# Patient Record
Sex: MALE | Race: OTHER | Employment: FULL TIME | URBAN - METROPOLITAN AREA
[De-identification: names, ages, dates, MRNs, and addresses within clinical notes are randomized per-mention and may not be internally consistent; named-entity substitution may affect disease eponyms.]

---

## 2017-03-30 PROBLEM — F19.10 POLYSUBSTANCE ABUSE (HCC): Status: ACTIVE | Noted: 2017-03-30

## 2017-03-31 PROBLEM — I10 ESSENTIAL HYPERTENSION: Status: ACTIVE | Noted: 2017-03-31

## 2017-03-31 PROBLEM — F14.20 COCAINE USE DISORDER, SEVERE, DEPENDENCE (HCC): Status: ACTIVE | Noted: 2017-03-31

## 2018-01-31 PROBLEM — F10.20 UNCOMPLICATED ALCOHOL DEPENDENCE (HCC): Status: ACTIVE | Noted: 2018-01-31

## 2018-01-31 PROBLEM — F19.20 SUBSTANCE DEPENDENCY (HCC): Status: ACTIVE | Noted: 2018-01-31

## 2018-01-31 NOTE — ED NOTES
LINDSAY level of care assessment was completed at [facility]. When pt is awake it will be re staffed with on call psychiatrist for final disposition.

## 2018-01-31 NOTE — ED PROVIDER NOTES
Patient is being admitted at the [facility] voluntarily for treatment. He was awake and conversant.   He was able tolerate with steady gait

## 2018-01-31 NOTE — ED INITIAL ASSESSMENT (HPI)
Pt walk-in with family from [facility], requesting detox from heroin and cocaine. States last use was yesterday around noon. Assessment started at [facility], pt unable to participate, too drowsy, sent here for med clearance.  Denies SI  Or HI

## 2018-01-31 NOTE — ED NOTES
Met with pt re: inpt detox. Pt is agreeable to inpt detox. Writer explained process, pt verbalized understanding.

## 2018-01-31 NOTE — ED PROVIDER NOTES
Patient Seen in: [facility] Emergency Department    History   Patient presents with:  Medical Clearance (constitutional)    Stated Complaint:     HPI    Patient is a 64-year-old with a history of hypertension, previous aortic valve replacement due t negative except as noted above.     Physical Exam   ED Triage Vitals [01/31/18 0431]  BP: 126/69  Pulse: 58  Resp: 16  Temp: 98.2 °F (36.8 °C)  Temp src: Temporal  SpO2: 96 %  O2 Device: None (Room air)    Current:BP 96/62   Pulse 56   Temp 98.2 °F (36.8 °C URINALYSIS WITH CULTURE REFLEX   RAINBOW DRAW BLUE   RAINBOW DRAW LAVENDER   RAINBOW DRAW LIGHT GREEN   RAINBOW DRAW GOLD     EKG    Rate, intervals and axes as noted on EKG Report.   Rate: 55  Rhythm: Sinus Rhythm  Reading: No dysrhythmia no ischemic nik

## 2018-01-31 NOTE — ED NOTES
Assumed care of patient. Patient resting comfortably on cart at this time. Appears to be sleeping. Will continue to monitor.

## 2018-01-31 NOTE — ED NOTES
Report given to Augustin Hernández RN at [facility] at this time.

## 2018-01-31 NOTE — BH LEVEL OF CARE ASSESSMENT
Level of Care Assessment Note     General Questions  Why are you here?: Requesting detox from alchol, crack, heroin and Suboxone. Precipitating Events: I am tired of living like this, I am losing my wife, my job.    History of Present Illness: Detoxed 10 items  Access to Firearm/Weapon: No  Discussion for Removal: n/a  Do you have a firearm owner ID card?: No  Access to Means Collateral Provided By[de-identified] wife  Describe Access to Means Collateral: no means     Self Injury  History of Self Injurious Behaviors: N first use?: 20  Route: Smoked  Average current amount used? : cant quantify  How long with this pattern of use?: years  Last Use?: 1/31/18  Longest period of sobriety/abstinence?: 10 years  Is your current use the most/worst it has ever been? : No          r/t Abuse: No  Possible Abuse Reportable to[de-identified] Not appropriate for reporting to authorities     General Appearance  Characteristics: Disheveled;Clothes inappropriate to setting  Eye Contact: Glaring  Psychomotor Behavior  Gait/Movement: Steady  Abnormal mov

## 2018-02-01 PROBLEM — D64.9 ANEMIA: Status: ACTIVE | Noted: 2018-02-01

## 2018-02-01 PROBLEM — K64.9 HEMORRHOID: Status: ACTIVE | Noted: 2018-02-01

## 2018-02-01 PROBLEM — Z95.3 HISTORY OF AORTIC VALVE REPLACEMENT WITH BIOPROSTHETIC VALVE: Status: ACTIVE | Noted: 2018-02-01

## 2018-02-05 ENCOUNTER — APPOINTMENT (OUTPATIENT)
Dept: GENERAL RADIOLOGY | Facility: HOSPITAL | Age: 48
End: 2018-02-05
Attending: EMERGENCY MEDICINE
Payer: COMMERCIAL

## 2018-02-05 ENCOUNTER — HOSPITAL ENCOUNTER (EMERGENCY)
Facility: HOSPITAL | Age: 48
Discharge: HOME | End: 2018-02-05
Attending: EMERGENCY MEDICINE
Payer: COMMERCIAL

## 2018-02-05 VITALS
DIASTOLIC BLOOD PRESSURE: 77 MMHG | HEART RATE: 72 BPM | SYSTOLIC BLOOD PRESSURE: 118 MMHG | TEMPERATURE: 97 F | WEIGHT: 215 LBS | HEIGHT: 76 IN | RESPIRATION RATE: 18 BRPM | BODY MASS INDEX: 26.18 KG/M2 | OXYGEN SATURATION: 96 %

## 2018-02-05 DIAGNOSIS — R07.89 CHEST PAIN, ATYPICAL: Primary | ICD-10-CM

## 2018-02-05 LAB
ANION GAP SERPL CALC-SCNC: 8 MMOL/L (ref 0–18)
BASOPHILS # BLD: 0.1 K/UL (ref 0–0.2)
BASOPHILS NFR BLD: 1 %
BUN SERPL-MCNC: 20 MG/DL (ref 8–20)
BUN/CREAT SERPL: 18.2 (ref 10–20)
CALCIUM SERPL-MCNC: 9.5 MG/DL (ref 8.5–10.5)
CHLORIDE SERPL-SCNC: 104 MMOL/L (ref 95–110)
CO2 SERPL-SCNC: 27 MMOL/L (ref 22–32)
CREAT SERPL-MCNC: 1.1 MG/DL (ref 0.5–1.5)
EOSINOPHIL # BLD: 0.1 K/UL (ref 0–0.7)
EOSINOPHIL NFR BLD: 1 %
ERYTHROCYTE [DISTWIDTH] IN BLOOD BY AUTOMATED COUNT: 13.4 % (ref 11–15)
GLUCOSE SERPL-MCNC: 91 MG/DL (ref 70–99)
HCT VFR BLD AUTO: 39.3 % (ref 41–52)
HGB BLD-MCNC: 12.7 G/DL (ref 13.5–17.5)
LYMPHOCYTES # BLD: 1.5 K/UL (ref 1–4)
LYMPHOCYTES NFR BLD: 18 %
MCH RBC QN AUTO: 29 PG (ref 27–32)
MCHC RBC AUTO-ENTMCNC: 32.4 G/DL (ref 32–37)
MCV RBC AUTO: 89.6 FL (ref 80–100)
MONOCYTES # BLD: 0.9 K/UL (ref 0–1)
MONOCYTES NFR BLD: 10 %
NEUTROPHILS # BLD AUTO: 6.1 K/UL (ref 1.8–7.7)
NEUTROPHILS NFR BLD: 70 %
OSMOLALITY UR CALC.SUM OF ELEC: 290 MOSM/KG (ref 275–295)
PLATELET # BLD AUTO: 253 K/UL (ref 140–400)
PMV BLD AUTO: 8.5 FL (ref 7.4–10.3)
POTASSIUM SERPL-SCNC: 3.9 MMOL/L (ref 3.3–5.1)
RBC # BLD AUTO: 4.39 M/UL (ref 4.5–5.9)
SODIUM SERPL-SCNC: 139 MMOL/L (ref 136–144)
TROPONIN I SERPL-MCNC: 0 NG/ML (ref ?–0.03)
TROPONIN I SERPL-MCNC: 0 NG/ML (ref ?–0.03)
WBC # BLD AUTO: 8.7 K/UL (ref 4–11)

## 2018-02-05 PROCEDURE — 71045 X-RAY EXAM CHEST 1 VIEW: CPT | Performed by: EMERGENCY MEDICINE

## 2018-02-05 PROCEDURE — 93005 ELECTROCARDIOGRAM TRACING: CPT

## 2018-02-05 PROCEDURE — 80048 BASIC METABOLIC PNL TOTAL CA: CPT | Performed by: EMERGENCY MEDICINE

## 2018-02-05 PROCEDURE — 85025 COMPLETE CBC W/AUTO DIFF WBC: CPT | Performed by: EMERGENCY MEDICINE

## 2018-02-05 PROCEDURE — 84484 ASSAY OF TROPONIN QUANT: CPT | Performed by: EMERGENCY MEDICINE

## 2018-02-05 PROCEDURE — 93010 ELECTROCARDIOGRAM REPORT: CPT | Performed by: EMERGENCY MEDICINE

## 2018-02-05 PROCEDURE — 99285 EMERGENCY DEPT VISIT HI MDM: CPT

## 2018-02-05 PROCEDURE — 36415 COLL VENOUS BLD VENIPUNCTURE: CPT

## 2018-02-05 RX ORDER — MAGNESIUM HYDROXIDE/ALUMINUM HYDROXICE/SIMETHICONE 120; 1200; 1200 MG/30ML; MG/30ML; MG/30ML
30 SUSPENSION ORAL ONCE
Status: COMPLETED | OUTPATIENT
Start: 2018-02-05 | End: 2018-02-05

## 2018-02-05 RX ORDER — DICYCLOMINE HYDROCHLORIDE 10 MG/5ML
20 SOLUTION ORAL ONCE
Status: COMPLETED | OUTPATIENT
Start: 2018-02-05 | End: 2018-02-05

## 2018-02-05 NOTE — ED INITIAL ASSESSMENT (HPI)
Patient presents to ER with c/o intermittent right sternal chest pain that began about 30 mins PTA.  + SOB.

## 2018-02-05 NOTE — ED PROVIDER NOTES
Patient Seen in: [facility] Emergency Department    History   Patient presents with:  Chest Pain Angina (cardiovascular)    Stated Complaint: cp    HPI    52-year-old male presents the emergency department with was visiting a family member.   He ha distress. HENT:   Head: Normocephalic and atraumatic. Mouth/Throat: Oropharynx is clear and moist.   Eyes: Conjunctivae and EOM are normal. Pupils are equal, round, and reactive to light. Neck: Normal range of motion. Neck supple.    Cardiovascular: N STEMI           ED Course as of Feb 05 1055  ------------------------------------------------------------       MDM     Two troponins are normal and symptoms not typical for cardiac etiology. Patient pain free in the ED. Will discharge home.  No interval ch

## (undated) NOTE — LETTER
January 31, 2018    Patient: Shreya Park   Date of Visit: 1/31/2018       To Whom It May Concern:    Garrick Nunez was seen and treated in our emergency department on 1/31/2018.     If you have any questions or concerns, please don't hesitate to haider

## (undated) NOTE — LETTER
February 5, 2018    Patient: Becky Brar   Date of Visit: 2/5/2018       To Whom It May Concern:    Praneeth Sexton was seen and treated in our emergency department on 2/5/2018. He may return to work 2/6/2018.     If you have any questions or concerns

## (undated) NOTE — ED AVS SNAPSHOT
Gianfranco Lucero   MRN: [id number]    Department:  [facility] Emergency Department   Date of Visit:  2/5/2018           Disclosure     Insurance plans vary and the physician(s) referred by the ER may not be covered by your plan.  Please contact y CARE PHYSICIAN AT ONCE OR RETURN IMMEDIATELY TO THE EMERGENCY DEPARTMENT. If you have been prescribed any medication(s), please fill your prescription right away and begin taking the medication(s) as directed.   If you believe that any of the medications